# Patient Record
Sex: FEMALE | Race: WHITE | HISPANIC OR LATINO | Employment: FULL TIME | URBAN - METROPOLITAN AREA
[De-identification: names, ages, dates, MRNs, and addresses within clinical notes are randomized per-mention and may not be internally consistent; named-entity substitution may affect disease eponyms.]

---

## 2021-06-21 ENCOUNTER — EVALUATION (OUTPATIENT)
Dept: PHYSICAL THERAPY | Facility: CLINIC | Age: 46
End: 2021-06-21
Payer: COMMERCIAL

## 2021-06-21 DIAGNOSIS — M54.42 CHRONIC LEFT-SIDED LOW BACK PAIN WITH LEFT-SIDED SCIATICA: Primary | ICD-10-CM

## 2021-06-21 DIAGNOSIS — G89.29 CHRONIC LEFT-SIDED LOW BACK PAIN WITH LEFT-SIDED SCIATICA: Primary | ICD-10-CM

## 2021-06-21 PROCEDURE — 97535 SELF CARE MNGMENT TRAINING: CPT | Performed by: PHYSICAL THERAPIST

## 2021-06-21 PROCEDURE — 97162 PT EVAL MOD COMPLEX 30 MIN: CPT | Performed by: PHYSICAL THERAPIST

## 2021-06-21 RX ORDER — BENAZEPRIL HYDROCHLORIDE 5 MG/1
5 TABLET, FILM COATED ORAL DAILY
COMMUNITY

## 2021-06-21 RX ORDER — AMLODIPINE BESYLATE AND ATORVASTATIN CALCIUM 5; 10 MG/1; MG/1
1 TABLET, FILM COATED ORAL DAILY
COMMUNITY

## 2021-06-21 NOTE — LETTER
2021    Sara Mckeon MD  [address] [address] [facility] [address]  N    Patient: Ingrid Zuniga   YOB: 1975   Date of Visit: 2021     Encounter Diagnosis     ICD-10-CM    1  Chronic left-sided low back pain with left-sided sciatica  M54 42     G89 29        Dear Dr King Oconnell: Thank you for your recent referral of Ingrid Zuniga  Please review the attached evaluation summary from Sumaya's recent visit  Please verify that you agree with the plan of care by signing the attached order  If you have any questions or concerns, please do not hesitate to call  I sincerely appreciate the opportunity to share in the care of one of your patients and hope to have another opportunity to work with you in the near future  Sincerely,    Javi Mckenna, PT      Referring Provider:      I certify that I have read the below Plan of Care and certify the need for these services furnished under this plan of treatment while under my care  Sara Mckeon MD  [facility] [address] [facility] [address]  N  Via Fax: [phone]          PT Evaluation     Today's date: 2021  Patient name: Ingrid Zuniga  : 1975  MRN: [id number]  Referring provider: King Isma MD  Dx:   Encounter Diagnosis     ICD-10-CM    1  Chronic left-sided low back pain with left-sided sciatica  M54 42     G89 29        Start Time: 1530  Stop Time: 1610  Total time in clinic (min): 40 minutes    Assessment  Assessment details: Pt presents with c/c of chronic low back pain with left LE radiculopathy  PT notes the patient with directional preference for extension with the patient reporting decreased pain and demonstrating improved mechanics following repeated extension in lying with self overpressure  Decreased L-spine ROM noted, with improvement noted following extension in lying   Decreased LE mm strength, L>R, with minor pain reported with resisted hip flexion and IR on the left  Discussed findings of evaluation with the patient, current limitations, and POC to address deficits  Pt would benefit from course of skilled therapy to decrease symptoms and restore normal functional level  Prognosis is good with adherence to skilled PT 2-3x/wk and compliance to HEP  Based upon examination, no other referral appears necessary at this time  Impairments: abnormal or restricted ROM, activity intolerance, impaired physical strength, lacks appropriate home exercise program, pain with function, poor posture  and poor body mechanics  Understanding of Dx/Px/POC: good   Prognosis: good    Goals  Short Term Goals  1  Pt will decrease pain in low back 25-50% in 4 wks   2  Pt will improve L-spine ROM to WNL in 4 wks   3  Pt will be independent with HEP in 4 wks     Long Term Goals  1  Pt will decrease pain in low back % in 8 wks   2  Pt will improve LE mm strength to 5/5 t/o in 8 wks   3  Pt will return to all previous activities without symptoms in 8 wks       Plan  Patient would benefit from: PT eval and skilled physical therapy  Referral necessary: No  Planned modality interventions: cryotherapy and thermotherapy: hydrocollator packs  Planned therapy interventions: abdominal trunk stabilization, balance, flexibility, functional ROM exercises, graded exercise, home exercise program, joint mobilization, manual therapy, neuromuscular re-education, patient education, postural training, strengthening, stretching and therapeutic exercise  Frequency: 2x week  Duration in weeks: 12  Treatment plan discussed with: patient        Subjective Evaluation    History of Present Illness  Mechanism of injury: Pt presents with c/c of chronic left sided low back with radiculopathy  Pt reports symptoms began several years after falling onto her left side  Reports she was doing some exercises and stretches afterwards and symptoms mostly went away, however, some lingered   Symptoms were worsened approximately 2 years after another fall on snow/ice, also landing on her left side  Currently reports symptoms in the left low back with occasional radicular symptoms into the left LE to the sole of the left foot  Symptoms are worse with sitting for long period of time, forward flexion, and sleeping on her side  Typically feels better with activity and using heat  Radicular symptoms typically present with fwd flexion and lifting  Pt works part time in office, 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 with orders for OPPT      Pain  Current pain ratin  At best pain ratin  At worst pain rating: 10  Location: Low back   Quality: sharp and throbbing  Relieving factors: heat  Aggravating factors: sitting, stair climbing and lifting  Progression: no change    Social Support    Employment status: working  Hand dominance: right    Treatments  Current treatment: physical therapy  Patient Goals  Patient goals for therapy: decreased pain, increased motion, increased strength and independence with ADLs/IADLs  Patient goal: return to running         Objective     Active Range of Motion     Lumbar   Flexion:  with pain Restriction level: minimal  Extension:  with pain Restriction level: moderate  Left lateral flexion:  with pain Restriction level: moderate  Right lateral flexion:  with pain Restriction level: moderate  Left rotation:  Restriction level: minimal  Right rotation:  Restriction level: minimal  Mechanical Assessment    Cervical      Thoracic      Lumbar    Lying extension: repeated movements  Pain intensity: better  Pain level: abolished  with self OP    Strength/Myotome Testing     Left Hip   Planes of Motion   Flexion: 4+  Extension: 5  Abduction: 4+  Adduction: 4+  External rotation: 4+  Internal rotation: 4+    Right Hip   Planes of Motion   Flexion: 5  Extension: 5  Abduction: 5  Adduction: 5  External rotation: 5  Internal rotation: 5    Left Knee   Flexion: 5  Extension: 5    Right Knee   Flexion: 5  Extension: 5    Additional Strength Details  Minor pain reported with resisted hip flexion and IR on the left     General Comments:      Lumbar Comments  Improved mechanics and decreased pain/symptoms reported in the low back after performing repeated extension in lying and repeated extension in lying with self OP                Precautions N/A        Manuals 6/21/21       L/S PA Mobs and OP                         Neuro Re-Ed                                 Ther Ex        NuStep         Mini Squat         St Hip 3-way        Prone Press Up         PPT         Hip Add        Hip Abd         Bridge         LTR         Leg Press         Leg Ext         Leg Curl                 HEP Provided        Ther Activity                        Gait Training                        Modalities        MHP Prn

## 2021-06-21 NOTE — PROGRESS NOTES
PT Evaluation     Today's date: 2021  Patient name: Rylee Da Silva  : 1975  MRN: [id number]  Referring provider: Robyn Spangler MD  Dx:   Encounter Diagnosis     ICD-10-CM    1  Chronic left-sided low back pain with left-sided sciatica  M54 42     G89 29        Start Time: 1530  Stop Time: 1610  Total time in clinic (min): 40 minutes    Assessment  Assessment details: Pt presents with c/c of chronic low back pain with left LE radiculopathy  PT notes the patient with directional preference for extension with the patient reporting decreased pain and demonstrating improved mechanics following repeated extension in lying with self overpressure  Decreased L-spine ROM noted, with improvement noted following extension in lying  Decreased LE mm strength, L>R, with minor pain reported with resisted hip flexion and IR on the left  Discussed findings of evaluation with the patient, current limitations, and POC to address deficits  Pt would benefit from course of skilled therapy to decrease symptoms and restore normal functional level  Prognosis is good with adherence to skilled PT 2-3x/wk and compliance to HEP  Based upon examination, no other referral appears necessary at this time  Impairments: abnormal or restricted ROM, activity intolerance, impaired physical strength, lacks appropriate home exercise program, pain with function, poor posture  and poor body mechanics  Understanding of Dx/Px/POC: good   Prognosis: good    Goals  Short Term Goals  1  Pt will decrease pain in low back 25-50% in 4 wks   2  Pt will improve L-spine ROM to WNL in 4 wks   3  Pt will be independent with HEP in 4 wks     Long Term Goals  1  Pt will decrease pain in low back % in 8 wks   2  Pt will improve LE mm strength to 5/5 t/o in 8 wks   3   Pt will return to all previous activities without symptoms in 8 wks       Plan  Patient would benefit from: PT eval and skilled physical therapy  Referral necessary: No  Planned modality interventions: cryotherapy and thermotherapy: hydrocollator packs  Planned therapy interventions: abdominal trunk stabilization, balance, flexibility, functional ROM exercises, graded exercise, home exercise program, joint mobilization, manual therapy, neuromuscular re-education, patient education, postural training, strengthening, stretching and therapeutic exercise  Frequency: 2x week  Duration in weeks: 12  Treatment plan discussed with: patient        Subjective Evaluation    History of Present Illness  Mechanism of injury: Pt presents with c/c of chronic left sided low back with radiculopathy  Pt reports symptoms began several years after falling onto her left side  Reports she was doing some exercises and stretches afterwards and symptoms mostly went away, however, some lingered  Symptoms were worsened approximately 2 years after another fall on snow/ice, also landing on her left side  Currently reports symptoms in the left low back with occasional radicular symptoms into the left LE to the sole of the left foot  Symptoms are worse with sitting for long period of time, forward flexion, and sleeping on her side  Typically feels better with activity and using heat  Radicular symptoms typically present with fwd flexion and lifting  Pt works part time in office, 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 with orders for OPPT      Pain  Current pain ratin  At best pain ratin  At worst pain rating: 10  Location: Low back   Quality: sharp and throbbing  Relieving factors: heat  Aggravating factors: sitting, stair climbing and lifting  Progression: no change    Social Support    Employment status: working  Hand dominance: right    Treatments  Current treatment: physical therapy  Patient Goals  Patient goals for therapy: decreased pain, increased motion, increased strength and independence with ADLs/IADLs  Patient goal: return to running         Objective     Active Range of Motion Lumbar   Flexion:  with pain Restriction level: minimal  Extension:  with pain Restriction level: moderate  Left lateral flexion:  with pain Restriction level: moderate  Right lateral flexion:  with pain Restriction level: moderate  Left rotation:  Restriction level: minimal  Right rotation:  Restriction level: minimal  Mechanical Assessment    Cervical      Thoracic      Lumbar    Lying extension: repeated movements  Pain intensity: better  Pain level: abolished  with self OP    Strength/Myotome Testing     Left Hip   Planes of Motion   Flexion: 4+  Extension: 5  Abduction: 4+  Adduction: 4+  External rotation: 4+  Internal rotation: 4+    Right Hip   Planes of Motion   Flexion: 5  Extension: 5  Abduction: 5  Adduction: 5  External rotation: 5  Internal rotation: 5    Left Knee   Flexion: 5  Extension: 5    Right Knee   Flexion: 5  Extension: 5    Additional Strength Details  Minor pain reported with resisted hip flexion and IR on the left     General Comments:      Lumbar Comments  Improved mechanics and decreased pain/symptoms reported in the low back after performing repeated extension in lying and repeated extension in lying with self OP                Precautions N/A        Manuals 6/21/21       L/S PA Mobs and OP                         Neuro Re-Ed                                 Ther Ex        NuStep         Mini Squat         St Hip 3-way        Prone Press Up         PPT         Hip Add        Hip Abd         Bridge         LTR         Leg Press         Leg Ext         Leg Curl                 HEP Provided        Ther Activity                        Gait Training                        Modalities        MHP Prn

## 2021-06-24 ENCOUNTER — OFFICE VISIT (OUTPATIENT)
Dept: PHYSICAL THERAPY | Facility: CLINIC | Age: 46
End: 2021-06-24
Payer: COMMERCIAL

## 2021-06-24 DIAGNOSIS — M54.42 CHRONIC LEFT-SIDED LOW BACK PAIN WITH LEFT-SIDED SCIATICA: Primary | ICD-10-CM

## 2021-06-24 DIAGNOSIS — G89.29 CHRONIC LEFT-SIDED LOW BACK PAIN WITH LEFT-SIDED SCIATICA: Primary | ICD-10-CM

## 2021-06-24 PROCEDURE — 97110 THERAPEUTIC EXERCISES: CPT | Performed by: PHYSICAL THERAPIST

## 2021-06-24 NOTE — PROGRESS NOTES
Daily Note     Today's date: 2021  Patient name: Matilde Peterson  : 1975  MRN: [id number]  Referring provider: Ivan Petersen MD  Dx:   Encounter Diagnosis     ICD-10-CM    1  Chronic left-sided low back pain with left-sided sciatica  M54 42     G89 29        Start Time: 910  Stop Time: 1005  Total time in clinic (min): 55 minutes    Subjective: Pt reports "the low back feels much better," however, notes she started to get some discomfort in the upper back and scapular area  Objective: See treatment diary below      Assessment: Initiated TE program  Tolerated treatment well  Cuing required for proper exercise technique  No pain reported with exercises  Decreased soreness felt in the mid back/thoracic region following retraction/extension and thoracic whips  No adverse reaction noted to MHP application  Advised to continue with HEP  Progress as tolerated  Patient would benefit from continued PT      Plan: Continue per plan of care           Precautions N/A        Manuals 21      L/S PA Mobs and OP                         Neuro Re-Ed                                 Ther Ex        NuStep   L3 10'       Mini Squat   20x      St Hip 3-way  20x ea B/L       Prone Press Up         PPT   20x 3"       Hip Add  20x 3"      Hip Abd   20x 3" Green       Bridge   20x       LTR   20x 2-3"       C Retract  10x       C Retract/Ext  10x       Thoracic Whip  20x               HEP Provided        Ther Activity                        Gait Training                        Modalities        MHP Prn  5' Post tx

## 2021-07-19 NOTE — PROGRESS NOTES
PT Discharge    Today's date: 2021  Patient name: Elryo Degroot  : 1975  MRN: [id number]  Referring provider: Riccardo Bueno MD  Dx:   Encounter Diagnosis     ICD-10-CM    1  Chronic left-sided low back pain with left-sided sciatica  M54 42     G89 29        Start Time: 910  Stop Time: 1005  Total time in clinic (min): 55 minutes    Assessment  Assessment details: Pt presents with c/c of chronic low back pain with left LE radiculopathy  PT notes the patient with directional preference for extension with the patient reporting decreased pain and demonstrating improved mechanics following repeated extension in lying with self overpressure  Decreased L-spine ROM noted, with improvement noted following extension in lying  Decreased LE mm strength, L>R, with minor pain reported with resisted hip flexion and IR on the left  Discussed findings of evaluation with the patient, current limitations, and POC to address deficits  Pt would benefit from course of skilled therapy to decrease symptoms and restore normal functional level  Prognosis is good with adherence to skilled PT 2-3x/wk and compliance to HEP  Based upon examination, no other referral appears necessary at this time  DC 21  Pt seen for 2 total sessions of PT with last visit on 21  Scheduled appts were N/S by the patient  Pt contacted by [address] [place] [address] however, no return call received  Pt to be d/c at this time 2* clinic no-show policy  No additional objective measurements taken 2* pt not returning for formal d/c  Impairments: abnormal or restricted ROM, activity intolerance, impaired physical strength, lacks appropriate home exercise program, pain with function, poor posture  and poor body mechanics  Understanding of Dx/Px/POC: good   Prognosis: good    Goals  Short Term Goals  1  Pt will decrease pain in low back 25-50% in 4 wks   2  Pt will improve L-spine ROM to WNL in 4 wks   3   Pt will be independent with HEP in 4 wks     Long Term Goals  1  Pt will decrease pain in low back % in 8 wks   2  Pt will improve LE mm strength to 5/5 t/o in 8 wks   3  Pt will return to all previous activities without symptoms in 8 wks       Plan  Referral necessary: No        Subjective Evaluation    History of Present Illness  Mechanism of injury: Pt presents with c/c of chronic left sided low back with radiculopathy  Pt reports symptoms began several years after falling onto her left side  Reports she was doing some exercises and stretches afterwards and symptoms mostly went away, however, some lingered  Symptoms were worsened approximately 2 years after another fall on snow/ice, also landing on her left side  Currently reports symptoms in the left low back with occasional radicular symptoms into the left LE to the sole of the left foot  Symptoms are worse with sitting for long period of time, forward flexion, and sleeping on her side  Typically feels better with activity and using heat  Radicular symptoms typically present with fwd flexion and lifting  Pt works part time in office, 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 with orders for OPPT      Pain  Current pain ratin  At best pain ratin  At worst pain rating: 10  Location: Low back   Quality: sharp and throbbing  Relieving factors: heat  Aggravating factors: sitting, stair climbing and lifting  Progression: no change    Social Support    Employment status: working  Hand dominance: right    Treatments  Current treatment: physical therapy  Patient Goals  Patient goals for therapy: decreased pain, increased motion, increased strength and independence with ADLs/IADLs  Patient goal: return to running         Objective     Active Range of Motion     Lumbar   Flexion:  with pain Restriction level: minimal  Extension:  with pain Restriction level: moderate  Left lateral flexion:  with pain Restriction level: moderate  Right lateral flexion:  with pain Restriction level: moderate  Left rotation:  Restriction level: minimal  Right rotation:  Restriction level: minimal  Mechanical Assessment    Cervical      Thoracic      Lumbar    Lying extension: repeated movements  Pain intensity: better  Pain level: abolished  with self OP    Strength/Myotome Testing     Left Hip   Planes of Motion   Flexion: 4+  Extension: 5  Abduction: 4+  Adduction: 4+  External rotation: 4+  Internal rotation: 4+    Right Hip   Planes of Motion   Flexion: 5  Extension: 5  Abduction: 5  Adduction: 5  External rotation: 5  Internal rotation: 5    Left Knee   Flexion: 5  Extension: 5    Right Knee   Flexion: 5  Extension: 5    Additional Strength Details  Minor pain reported with resisted hip flexion and IR on the left     General Comments:      Lumbar Comments  Improved mechanics and decreased pain/symptoms reported in the low back after performing repeated extension in lying and repeated extension in lying with self OP

## 2021-07-28 ENCOUNTER — TELEPHONE (OUTPATIENT)
Dept: PHYSICAL THERAPY | Facility: CLINIC | Age: 46
End: 2021-07-28

## 2021-07-28 NOTE — TELEPHONE ENCOUNTER
PT spoke with pt regarding status  She reports she is going to have Hysterectomy as MD feels other abdominal issues contributing to symptoms she is experiencing  Will not be returning to PT at this time

## 2022-01-02 ENCOUNTER — OFFICE VISIT (OUTPATIENT)
Dept: URGENT CARE | Facility: CLINIC | Age: 47
End: 2022-01-02
Payer: COMMERCIAL

## 2022-01-02 VITALS
TEMPERATURE: 97.1 F | OXYGEN SATURATION: 98 % | RESPIRATION RATE: 18 BRPM | HEART RATE: 78 BPM | HEIGHT: 64 IN | WEIGHT: 230 LBS | BODY MASS INDEX: 39.27 KG/M2

## 2022-01-02 DIAGNOSIS — H65.91 RIGHT NON-SUPPURATIVE OTITIS MEDIA: ICD-10-CM

## 2022-01-02 DIAGNOSIS — R05.9 COUGH: Primary | ICD-10-CM

## 2022-01-02 PROCEDURE — 87636 SARSCOV2 & INF A&B AMP PRB: CPT | Performed by: PHYSICIAN ASSISTANT

## 2022-01-02 PROCEDURE — S9088 SERVICES PROVIDED IN URGENT: HCPCS | Performed by: PHYSICIAN ASSISTANT

## 2022-01-02 PROCEDURE — 99213 OFFICE O/P EST LOW 20 MIN: CPT | Performed by: PHYSICIAN ASSISTANT

## 2022-01-02 RX ORDER — PREDNISONE 10 MG/1
40 TABLET ORAL DAILY
Qty: 16 TABLET | Refills: 0 | Status: SHIPPED | OUTPATIENT
Start: 2022-01-02 | End: 2022-01-06

## 2022-01-02 RX ORDER — AMOXICILLIN 875 MG/1
875 TABLET, COATED ORAL 2 TIMES DAILY
Qty: 10 TABLET | Refills: 0 | Status: SHIPPED | OUTPATIENT
Start: 2022-01-02 | End: 2022-01-07

## 2022-01-02 NOTE — PROGRESS NOTES
[address] [employer] [facility]        NAME: Samantha Bamberger is a 55 y o  female  : 1975    MRN: [id number]  DATE: 2022  TIME: 2:22 PM    Assessment and Plan   Cough [R05 9]  1  Cough  COVID/FLU- Office Collect    predniSONE 10 mg tablet   2  Right non-suppurative otitis media  amoxicillin (AMOXIL) 875 mg tablet         Patient Instructions   Patient Instructions   Take the steroid and antibiotic         Follow up with PCP in 3-5 days  Proceed to  ER if symptoms worsen  Chief Complaint     Chief Complaint   Patient presents with    Cold Like Symptoms     sinus congestion , cough, back pain, x 1 week          History of Present Illness       The patient is a 27-year-old female presenting today for a cough, back pain from coughing and sinus congestion x1 week  Here with her daughter who is sick as well  Review of Systems   Review of Systems   Constitutional: Negative for activity change, appetite change, chills, fatigue and fever  HENT: Positive for congestion and sinus pain  Negative for rhinorrhea, sinus pressure and sore throat  Respiratory: Positive for cough  Negative for chest tightness and shortness of breath  Cardiovascular: Negative for chest pain and palpitations  Gastrointestinal: Negative for diarrhea, nausea and vomiting  Musculoskeletal: Positive for back pain  Negative for arthralgias and myalgias  Skin: Negative for color change and pallor  Neurological: Negative for headaches           Current Medications       Current Outpatient Medications:     amLODIPine-atorvastatin (CADUET) 5-10 MG per tablet, Take 1 tablet by mouth daily, Disp: , Rfl:     benazepril (LOTENSIN) 5 mg tablet, Take 5 mg by mouth daily, Disp: , Rfl:     Riboflavin (VITAMIN B-2 PO), Take by mouth, Disp: , Rfl:     amoxicillin (AMOXIL) 875 mg tablet, Take 1 tablet (875 mg total) by mouth 2 (two) times a day for 5 days, Disp: 10 tablet, Rfl: 0    predniSONE 10 mg tablet, Take 4 tablets (40 mg total) by mouth daily for 4 days, Disp: 16 tablet, Rfl: 0    Current Allergies     Allergies as of 01/02/2022 - Reviewed 01/02/2022   Allergen Reaction Noted    Latex Itching, Hives, and Rash 11/09/2018            The following portions of the patient's history were reviewed and updated as appropriate: allergies, current medications, past family history, past medical history, past social history, past surgical history and problem list      Past Medical History:   Diagnosis Date    Hypertension     Stroke [facility])        History reviewed  No pertinent surgical history  History reviewed  No pertinent family history  Medications have been verified  Objective   Pulse 78   Temp (!) 97 1 °F (36 2 °C)   Resp 18   Ht 5' 4" (1 626 m)   Wt 104 kg (230 lb)   SpO2 98%   BMI 39 48 kg/m²        Physical Exam     Physical Exam  Vitals reviewed  Constitutional:       General: She is not in acute distress  Appearance: Normal appearance  She is normal weight  She is not ill-appearing, toxic-appearing or diaphoretic  HENT:      Head: Normocephalic and atraumatic  Ears:      Comments: Right TM injected       Nose: Nose normal  No congestion or rhinorrhea  Mouth/Throat:      Mouth: Mucous membranes are moist       Pharynx: Oropharynx is clear  No oropharyngeal exudate or posterior oropharyngeal erythema  Eyes:      Extraocular Movements: Extraocular movements intact  Conjunctiva/sclera: Conjunctivae normal       Pupils: Pupils are equal, round, and reactive to light  Cardiovascular:      Rate and Rhythm: Normal rate and regular rhythm  Heart sounds: Normal heart sounds  No murmur heard  No friction rub  No gallop  Pulmonary:      Effort: Pulmonary effort is normal  No respiratory distress  Breath sounds: Normal breath sounds  No stridor  No wheezing, rhonchi or rales  Chest:      Chest wall: No tenderness  Abdominal:      General: Abdomen is flat   Bowel sounds are normal  There is no distension  Palpations: Abdomen is soft  There is no mass  Tenderness: There is no abdominal tenderness  There is no guarding or rebound  Hernia: No hernia is present  Musculoskeletal:         General: Normal range of motion  Skin:     General: Skin is warm and dry  Capillary Refill: Capillary refill takes less than 2 seconds  Neurological:      Mental Status: She is alert

## 2022-01-07 LAB
FLUAV RNA RESP QL NAA+PROBE: NEGATIVE
FLUBV RNA RESP QL NAA+PROBE: NEGATIVE
SARS-COV-2 RNA RESP QL NAA+PROBE: POSITIVE

## 2022-01-08 NOTE — RESULT ENCOUNTER NOTE
Called the patient to go over her COVID results  She did not answer but I left a call back number  Patient did see her results in [address]